# Patient Record
Sex: MALE | Race: WHITE
[De-identification: names, ages, dates, MRNs, and addresses within clinical notes are randomized per-mention and may not be internally consistent; named-entity substitution may affect disease eponyms.]

---

## 2019-07-23 ENCOUNTER — HOSPITAL ENCOUNTER (INPATIENT)
Dept: HOSPITAL 96 - M.ERS | Age: 59
LOS: 6 days | Discharge: HOME | DRG: 872 | End: 2019-07-29
Attending: INTERNAL MEDICINE | Admitting: INTERNAL MEDICINE
Payer: COMMERCIAL

## 2019-07-23 VITALS — WEIGHT: 160 LBS | HEIGHT: 65.98 IN | BODY MASS INDEX: 25.71 KG/M2

## 2019-07-23 VITALS — SYSTOLIC BLOOD PRESSURE: 117 MMHG | DIASTOLIC BLOOD PRESSURE: 66 MMHG

## 2019-07-23 VITALS — DIASTOLIC BLOOD PRESSURE: 85 MMHG | SYSTOLIC BLOOD PRESSURE: 126 MMHG

## 2019-07-23 DIAGNOSIS — Z87.442: ICD-10-CM

## 2019-07-23 DIAGNOSIS — M48.02: ICD-10-CM

## 2019-07-23 DIAGNOSIS — N45.2: ICD-10-CM

## 2019-07-23 DIAGNOSIS — N45.1: ICD-10-CM

## 2019-07-23 DIAGNOSIS — N43.3: ICD-10-CM

## 2019-07-23 DIAGNOSIS — A41.9: Primary | ICD-10-CM

## 2019-07-23 DIAGNOSIS — N50.3: ICD-10-CM

## 2019-07-23 DIAGNOSIS — Z88.5: ICD-10-CM

## 2019-07-23 DIAGNOSIS — Z79.899: ICD-10-CM

## 2019-07-23 DIAGNOSIS — Z88.8: ICD-10-CM

## 2019-07-23 DIAGNOSIS — G89.29: ICD-10-CM

## 2019-07-23 LAB
ABSOLUTE BASOPHILS: (no result) THOU/UL (ref 0–0.2)
ABSOLUTE EOSINOPHILS: (no result) THOU/UL (ref 0–0.7)
ABSOLUTE MONOCYTES: (no result) THOU/UL (ref 0–1.2)
ABSOLUTE MONOCYTES: 1.2 THOU/UL (ref 0–1.2)
ALBUMIN SERPL-MCNC: 3.2 G/DL (ref 3.4–5)
ALP SERPL-CCNC: 185 U/L (ref 46–116)
ALT SERPL-CCNC: 107 U/L (ref 30–65)
ANION GAP SERPL CALC-SCNC: 12 MMOL/L (ref 7–16)
AST SERPL-CCNC: 57 U/L (ref 15–37)
BASOPHILS NFR BLD AUTO: (no result) %
BILIRUB SERPL-MCNC: 0.9 MG/DL
BILIRUB UR-MCNC: NEGATIVE MG/DL
BUN SERPL-MCNC: 11 MG/DL (ref 7–18)
CALCIUM SERPL-MCNC: 9.5 MG/DL (ref 8.5–10.1)
CHLORIDE SERPL-SCNC: 99 MMOL/L (ref 98–107)
CO2 SERPL-SCNC: 24 MMOL/L (ref 21–32)
COLOR UR: YELLOW
CREAT SERPL-MCNC: 1 MG/DL (ref 0.6–1.3)
EOSINOPHIL NFR BLD: (no result) %
GLUCOSE SERPL-MCNC: 144 MG/DL (ref 70–99)
GRANULOCYTES NFR BLD MANUAL: (no result) %
GRANULOCYTES NFR BLD MANUAL: 95 %
HCT VFR BLD CALC: (no result) % (ref 42–52)
HCT VFR BLD CALC: 41.8 % (ref 42–52)
HGB BLD-MCNC: (no result) GM/DL (ref 14–18)
HGB BLD-MCNC: 14.1 GM/DL (ref 14–18)
KETONES UR STRIP-MCNC: (no result) MG/DL
LYMPHOCYTES # BLD: (no result) THOU/UL (ref 0.8–5.3)
LYMPHOCYTES # BLD: 0.3 THOU/UL (ref 0.8–5.3)
LYMPHOCYTES NFR BLD AUTO: (no result) %
LYMPHOCYTES NFR BLD AUTO: 1 %
MCH RBC QN AUTO: (no result) PG (ref 26–34)
MCH RBC QN AUTO: 32 PG (ref 26–34)
MCHC RBC AUTO-ENTMCNC: (no result) G/DL (ref 28–37)
MCHC RBC AUTO-ENTMCNC: 33.8 G/DL (ref 28–37)
MCV RBC: (no result) FL (ref 80–100)
MCV RBC: 94.6 FL (ref 80–100)
MONOCYTES NFR BLD: (no result) %
MONOCYTES NFR BLD: 4 %
MPV: (no result) FL. (ref 7.2–11.1)
MPV: 8.1 FL. (ref 7.2–11.1)
NEUTROPHILS # BLD: (no result) THOU/UL (ref 1.6–8.1)
NEUTROPHILS # BLD: 28.4 THOU/UL (ref 1.6–8.1)
NUCLEATED RBCS: (no result) /100WBC
NUCLEATED RBCS: 0 /100WBC
PLATELET # BLD EST: ADEQUATE 10*3/UL
PLATELET COUNT*: (no result) THOU/UL (ref 150–400)
PLATELET COUNT*: 359 THOU/UL (ref 150–400)
POTASSIUM SERPL-SCNC: 3.9 MMOL/L (ref 3.5–5.1)
PROT SERPL-MCNC: 7.8 G/DL (ref 6.4–8.2)
PROT UR QL STRIP: NEGATIVE
RBC # BLD AUTO: (no result) MIL/UL (ref 4.5–6)
RBC # BLD AUTO: 4.41 MIL/UL (ref 4.5–6)
RBC # UR STRIP: NEGATIVE /UL
RBC MORPH BLD: NORMAL
RDW-CV: (no result) % (ref 10.5–14.5)
RDW-CV: 12.8 % (ref 10.5–14.5)
SODIUM SERPL-SCNC: 135 MMOL/L (ref 136–145)
SP GR UR STRIP: <= 1.005 (ref 1–1.03)
URINE CLARITY: CLEAR
URINE GLUCOSE-RANDOM: NEGATIVE
URINE LEUKOCYTES-REFLEX: NEGATIVE
URINE NITRITE-REFLEX: NEGATIVE
UROBILINOGEN UR STRIP-ACNC: 0.2 E.U./DL (ref 0.2–1)
WBC # BLD AUTO: (no result) THOU/UL (ref 4–11)
WBC # BLD AUTO: 29.9 THOU/UL (ref 4–11)

## 2019-07-24 VITALS — DIASTOLIC BLOOD PRESSURE: 62 MMHG | SYSTOLIC BLOOD PRESSURE: 107 MMHG

## 2019-07-24 VITALS — SYSTOLIC BLOOD PRESSURE: 112 MMHG | DIASTOLIC BLOOD PRESSURE: 69 MMHG

## 2019-07-24 LAB
ABSOLUTE BASOPHILS: 0 THOU/UL (ref 0–0.2)
ABSOLUTE EOSINOPHILS: 0 THOU/UL (ref 0–0.7)
ABSOLUTE MONOCYTES: 2 THOU/UL (ref 0–1.2)
ANION GAP SERPL CALC-SCNC: 7 MMOL/L (ref 7–16)
BASOPHILS NFR BLD AUTO: 0.2 %
BUN SERPL-MCNC: 8 MG/DL (ref 7–18)
CALCIUM SERPL-MCNC: 8.4 MG/DL (ref 8.5–10.1)
CHLORIDE SERPL-SCNC: 104 MMOL/L (ref 98–107)
CO2 SERPL-SCNC: 28 MMOL/L (ref 21–32)
CREAT SERPL-MCNC: 0.8 MG/DL (ref 0.6–1.3)
EOSINOPHIL NFR BLD: 0 %
GLUCOSE SERPL-MCNC: 120 MG/DL (ref 70–99)
GRANULOCYTES NFR BLD MANUAL: 88.3 %
HCT VFR BLD CALC: 37 % (ref 42–52)
HGB BLD-MCNC: 12.6 GM/DL (ref 14–18)
LYMPHOCYTES # BLD: 0.9 THOU/UL (ref 0.8–5.3)
LYMPHOCYTES NFR BLD AUTO: 3.5 %
MCH RBC QN AUTO: 32.4 PG (ref 26–34)
MCHC RBC AUTO-ENTMCNC: 34 G/DL (ref 28–37)
MCV RBC: 95.2 FL (ref 80–100)
MONOCYTES NFR BLD: 8 %
MPV: 8.1 FL. (ref 7.2–11.1)
NEUTROPHILS # BLD: 21.9 THOU/UL (ref 1.6–8.1)
NUCLEATED RBCS: 0 /100WBC
PLATELET COUNT*: 306 THOU/UL (ref 150–400)
POTASSIUM SERPL-SCNC: 4.2 MMOL/L (ref 3.5–5.1)
RBC # BLD AUTO: 3.89 MIL/UL (ref 4.5–6)
RDW-CV: 13 % (ref 10.5–14.5)
SODIUM SERPL-SCNC: 139 MMOL/L (ref 136–145)
WBC # BLD AUTO: 24.8 THOU/UL (ref 4–11)

## 2019-07-24 NOTE — NUR
PATIENT ARRIVED ON FLOOR FROM ER ABOUT 2030. PATIENT ADMISSION HISTORY AND
ASSESSMENT WAS COMPLETED AS CHARTED. IV FLUIDS AND ANTIBIOTICS WERE GIVEN AS
ORDERED. PATIENT WAS GIVEN PAIN MEDICINE ABOUT EVERY TWO HOURS FOR PAIN. WILL
CONTINUE TO MONITOR.

## 2019-07-24 NOTE — NUR
PT.LIVES WITH HIS WIFE. SHE IS RETIRED AND WILL BE THERE TO HELP HIM IF NEEDED
AT DISCHARGE. HE IS INDEPENDENT AND ACTIVE NORMALLY. WORKS FULL TIME. NO USE
OF DME AND NO HX OF HH.

## 2019-07-24 NOTE — NUR
PATIENT ALERT AND ORIENTED X 4. VITAL SIGNS STABLE ON ROOM AIR. UP
INDEPENDENLY IN ROOM. DENIES NAUSEA. PAIN BEING MANAGED WITH PO MEDICATION. IV
PATENT AND SALINE LOCKED. HOURLY ROUNDS MAINTAINED THROUGHOUT THE SHIFT. CALL
LIGHT WITHIN REACH. NURSING WILL CONTINUE TO MONITOR.

## 2019-07-25 VITALS — SYSTOLIC BLOOD PRESSURE: 120 MMHG | DIASTOLIC BLOOD PRESSURE: 75 MMHG

## 2019-07-25 VITALS — DIASTOLIC BLOOD PRESSURE: 86 MMHG | SYSTOLIC BLOOD PRESSURE: 133 MMHG

## 2019-07-25 VITALS — DIASTOLIC BLOOD PRESSURE: 102 MMHG | SYSTOLIC BLOOD PRESSURE: 147 MMHG

## 2019-07-25 LAB
HCT VFR BLD CALC: 37.7 % (ref 42–52)
HGB BLD-MCNC: 12.6 GM/DL (ref 14–18)
MCH RBC QN AUTO: 32.1 PG (ref 26–34)
MCHC RBC AUTO-ENTMCNC: 33.4 G/DL (ref 28–37)
MCV RBC: 96 FL (ref 80–100)
MPV: 8.3 FL. (ref 7.2–11.1)
PLATELET COUNT*: 327 THOU/UL (ref 150–400)
RBC # BLD AUTO: 3.92 MIL/UL (ref 4.5–6)
RDW-CV: 13 % (ref 10.5–14.5)
WBC # BLD AUTO: 12.3 THOU/UL (ref 4–11)

## 2019-07-25 NOTE — NUR
ASSUMED CARE OF PATIENT AT APPROX 0730. ALERT AND ORIENTED X4. ASSESSMENT
COMPLETED AND AS CHARTED. PAIN MANAGED WITH ORAL AND IV MEDICATIONS. FLUIDS
AND ANTIBIOTICS INFUSED AS ORDERED. PATIENT UP AD ALICJA IN THE ROOM. NO
COMPLAINTS OF NAUSEA OR SOA. AFEBRILE THROUGHOUT SHIFT. CALL LIGHT WITHIN
REACH. HOURLY ROUNDS COMPLETED. WILL CONTINUE TO MONITOR.

## 2019-07-25 NOTE — NUR
PATIENT SLEPT PART OF THE NIGHT. IV FLUIDS AND VANC WERE GIVEN AS ORDERED.
PATIENT WAS GIVEN PAIN MEDICINE ABOUT EVERY 3-4 HOURS. PAIN MUCH BETTER
CONTROLLED TONIGHT THAN THE NIGHT BEFORE. WILL CONTINUE TO MONITOR.

## 2019-07-25 NOTE — CON
[address]  [address]                    CONSULTATION                  
_______________________________________________________________________________
 
Name:       SHAINA ALICEA            Room:           [address] [place]  
.R.#:  [id number]      Account #:      [id number]  
Admission:  07/23/19     Attend Phys:    EZRA Jones
Discharge:               Date of Birth:  [birth year]  
         Report #: [id number]
                                                                     [id number]  
_______________________________________________________________________________
THIS REPORT FOR:  //name//                      
 
CC: Jennifer Zamarripa
 
DATE OF SERVICE:  07/24/2019
 
 
INFECTIOUS DISEASE CONSULTATION
 
ATTENDING PHYSICIAN:  Bennett Zamarripa DO.
 
REASON FOR EVALUATION:  Left-sided epididymitis with orchitis.
 
HISTORY OF PRESENT ILLNESS:  Chart reviewed, patient examined.  This is a
59-year-old man with history of some nephrolithiasis, otherwise unremarkable,
who had a 1-2 week history of increasing pain and discomfort associated with
left lower quadrant, extending on to the scrotum and testicle.  He was concerned
about possible injury as a result of dancing.  It persisted and he was evaluated
as an outpatient.  I did raise the question of an infectious process.  He was
placed on antimicrobial therapy.  Additionally, associated with nausea and
anorexia, he did have chills, was initially given ciprofloxacin, was switched to
Macrobid as well.  Due to lack of improvement, he presented himself to the
Emergency Room.  Testicular ultrasound showed findings consistent with acute
left epididymitis, mild left orchitis, large right epididymal cyst as well.  He
was found to have elevated white blood cell count, protein 30,000, repeat was
24.8.  Urinalysis was unremarkable.  Lactic acid 1.1, mild elevation of liver
function tests as well.  Presumptively diagnosed with epididymitis.  He was
started empirically on antimicrobial therapy with vancomycin and ceftriaxone. 
Clinically, he has improved, he says, a moderate-to-marked degree at this point.
 He does still have ongoing pain, is utilizing ice which seems to help as well.
 
ALLERGIES:  LISTED TO CODEINE AND BOTOX ALONE.
 
CURRENT MEDICATIONS:  Include ceftriaxone, vancomycin, oxycodone, fentanyl as
needed, p.r.n. analgesics.
 
PAST MEDICAL HISTORY:  History of cervical stenosis as well as nephrolithiasis.
 
SOCIAL HISTORY:  Smokes an occasional cigar, has a glass of red wine on a daily
basis.  No illicit drug use.
 
FAMILY HISTORY:  Noncontributory.
 
REVIEW OF SYSTEMS:  Denies any significant pulmonary or gastrointestinal
complaints, otherwise unremarkable.  A 10-point review of systems except noted
 
 
 
[place] [address]                    CONSULTATION                  
_______________________________________________________________________________
 
Name:       SHAINA ALICEA            Room:           [address] [place]  
[facility]#:  [id number]      Account #:      [id number]  
Admission:  07/23/19     Attend Phys:    EZRA Jones
Discharge:               Date of Birth:  [birth year]  
         Report #: [id number]
                                                                     [id number]  
_______________________________________________________________________________
in the above history of present illness.
 
PHYSICAL EXAMINATION:
GENERAL:  He appears generally well-nourished, in mild-to-moderate distress.
VITAL SIGNS:  Temperature 99.3, pulse 106, respirations 16, blood pressure
117/66.
SKIN:  Warm, dry, no rashes.
HEENT:  Normocephalic.  Extraocular muscles are intact.
NECK:  Supple.
LUNGS:  Clear to auscultation bilaterally.
HEART:  Regular rate and rhythm without murmur.
ABDOMEN:  Soft, nontender, nondistended.
EXTREMITIES:  No cyanosis.
GENITOURINARY:  Moderate inflammatory changes noted.  Asymmetrical swelling on
the left side, it is quite tender to palpation, particularly over the left
testicle and under the inguinal site.  There are no ulcerations, no bullous
lesions.
RECTAL:  Deferred.
 
LABORATORY DATA:  Blood cultures sterile thus far.  Most recent CBC:  White
count 24.8, H and H 12.6 and 37.0, platelets 306.  Electrolytes:  Sodium 139,
potassium 4.2, chloride 104, bicarbonate 28, anion gap of 7, BUN and creatinine
8 and 0.8.  Urinalysis was unremarkable.  Lactic acid 1.1.  Liver functions: 
AST of 57, ALT of 107, total bilirubin of 0.9, total protein of 7.8, albumin
3.2.  Estimated GFR of 77.
 
ASSESSMENT:  Left-sided epididymitis, orchitis.  We will continue current
empiric therapeutic approach with vancomycin and ceftriaxone, at this point,
barring something positive on the blood culture, which I think is a fairly low
yield test, we would treat empirically.  At this point, he seems to have
responded to treatment.  Continue the combination of parenteral therapy.  At
some point we will likely transition to oral in the event of any localizing
signs or symptoms.  Repeat imaging.  Perhaps would be amenable to drainage
procedure.  Discussed with the patient in detail.
 
 
 
 
 
 
 
 
 
 
<ELECTRONICALLY SIGNED>
                                        By:  Manuel Hoffman MD           
07/25/19     1135
D: 07/24/19 1634_______________________________________
T: 07/25/19 0144Manuel Hoffman MD              /nt

## 2019-07-26 VITALS — DIASTOLIC BLOOD PRESSURE: 84 MMHG | SYSTOLIC BLOOD PRESSURE: 128 MMHG

## 2019-07-26 VITALS — SYSTOLIC BLOOD PRESSURE: 127 MMHG | DIASTOLIC BLOOD PRESSURE: 77 MMHG

## 2019-07-26 VITALS — SYSTOLIC BLOOD PRESSURE: 127 MMHG | DIASTOLIC BLOOD PRESSURE: 78 MMHG

## 2019-07-26 LAB
ABSOLUTE BASOPHILS: 0.1 THOU/UL (ref 0–0.2)
ABSOLUTE EOSINOPHILS: 0.1 THOU/UL (ref 0–0.7)
ABSOLUTE MONOCYTES: 0.8 THOU/UL (ref 0–1.2)
BASOPHILS NFR BLD AUTO: 0.9 %
EOSINOPHIL NFR BLD: 0.7 %
GRANULOCYTES NFR BLD MANUAL: 74.2 %
HCT VFR BLD CALC: 33.2 % (ref 42–52)
HGB BLD-MCNC: 11.1 GM/DL (ref 14–18)
LYMPHOCYTES # BLD: 1.3 THOU/UL (ref 0.8–5.3)
LYMPHOCYTES NFR BLD AUTO: 15.4 %
MCH RBC QN AUTO: 31.8 PG (ref 26–34)
MCHC RBC AUTO-ENTMCNC: 33.3 G/DL (ref 28–37)
MCV RBC: 95.6 FL (ref 80–100)
MONOCYTES NFR BLD: 8.8 %
MPV: 8.5 FL. (ref 7.2–11.1)
NEUTROPHILS # BLD: 6.4 THOU/UL (ref 1.6–8.1)
NUCLEATED RBCS: 0 /100WBC
PLATELET COUNT*: 305 THOU/UL (ref 150–400)
RBC # BLD AUTO: 3.47 MIL/UL (ref 4.5–6)
RDW-CV: 13.1 % (ref 10.5–14.5)
WBC # BLD AUTO: 8.6 THOU/UL (ref 4–11)

## 2019-07-26 NOTE — NUR
PT SLEPT OFF AND ON OVERNIGHT. IV AND PO PAIN MED GIVEN WITH FAIR RELIEF OF L
TESTICULAR PAIN. UP AD ALICJA VOIDING WITHOUT DIFFICULTY. AOX4, PLEASANT, STATES
HE FEELS THE  EPIDIDYMITIS IS IMPROVING SOMEWHAT, BUT STILL PAINFUL. AM LABS
DRAWN. LHAND IVF INFUSING PEP PUMP, ABX GIVEN AS ORDERED. ZOFRAN GIVEN PRN TO
ALLEVIATE NAUSEA THAT PAIN MEDS SOMETIMES CAUSE. ICE PACKS PRN FOR COMFORT TO
SCROTUM. ABLE TO USE CALL LITE AND MAKE NEEDS KNOWN.

## 2019-07-27 VITALS — DIASTOLIC BLOOD PRESSURE: 77 MMHG | SYSTOLIC BLOOD PRESSURE: 132 MMHG

## 2019-07-27 VITALS — DIASTOLIC BLOOD PRESSURE: 75 MMHG | SYSTOLIC BLOOD PRESSURE: 128 MMHG

## 2019-07-27 VITALS — SYSTOLIC BLOOD PRESSURE: 141 MMHG | DIASTOLIC BLOOD PRESSURE: 76 MMHG

## 2019-07-27 VITALS — DIASTOLIC BLOOD PRESSURE: 80 MMHG | SYSTOLIC BLOOD PRESSURE: 142 MMHG

## 2019-07-27 VITALS — SYSTOLIC BLOOD PRESSURE: 143 MMHG | DIASTOLIC BLOOD PRESSURE: 87 MMHG

## 2019-07-27 LAB
ANION GAP SERPL CALC-SCNC: 6 MMOL/L (ref 7–16)
BUN SERPL-MCNC: 6 MG/DL (ref 7–18)
CALCIUM SERPL-MCNC: 8.7 MG/DL (ref 8.5–10.1)
CHLORIDE SERPL-SCNC: 110 MMOL/L (ref 98–107)
CO2 SERPL-SCNC: 29 MMOL/L (ref 21–32)
CREAT SERPL-MCNC: 0.8 MG/DL (ref 0.6–1.3)
GLUCOSE SERPL-MCNC: 87 MG/DL (ref 70–99)
POTASSIUM SERPL-SCNC: 3.9 MMOL/L (ref 3.5–5.1)
SODIUM SERPL-SCNC: 145 MMOL/L (ref 136–145)

## 2019-07-27 NOTE — NUR
ASSUMED CARE OF PATIENT AT THIS TIME. REPORT RECEIVED FROM GEOVANNA WANG. NO
COMPLAINTS OF PAIN AT THIS TIME, NOTIFIED WHEN NEXT PAIN MEDICATION IS DUE.
ICE PACK REFILLED FOR SCROTUM. PRN MIRALAX GIVEN PER ORDERS. IVF INFUSING,
SCHED ABX TO INFUSE.

## 2019-07-27 NOTE — NUR
Alert and oriented x 4. He is up independently in the room. He's voided
adequately and without any discomfort. His scrotum has improved with the
swelling. He is using ice packs and his wife bought him a scrotal support and
that has helped. Vitals are stable. Roomair sat was 97-98% but he does have
some fine crackles in LLL, IS encouraged. He is taking pain meds every 4 hrs
and he rates his pain 8-9. He did get intermitten sleep.

## 2019-07-28 VITALS — SYSTOLIC BLOOD PRESSURE: 152 MMHG | DIASTOLIC BLOOD PRESSURE: 87 MMHG

## 2019-07-28 VITALS — SYSTOLIC BLOOD PRESSURE: 119 MMHG | DIASTOLIC BLOOD PRESSURE: 79 MMHG

## 2019-07-28 VITALS — DIASTOLIC BLOOD PRESSURE: 67 MMHG | SYSTOLIC BLOOD PRESSURE: 115 MMHG

## 2019-07-28 LAB
ABSOLUTE BASOPHILS: 0.1 THOU/UL (ref 0–0.2)
ABSOLUTE MONOCYTES: 0.6 THOU/UL (ref 0–1.2)
BASOPHILS NFR BLD AUTO: 1 %
GRANULOCYTES NFR BLD MANUAL: 72 %
HCT VFR BLD CALC: 31 % (ref 42–52)
HGB BLD-MCNC: 10.6 GM/DL (ref 14–18)
LYMPHOCYTES # BLD: 1.3 THOU/UL (ref 0.8–5.3)
LYMPHOCYTES NFR BLD AUTO: 16 %
MCH RBC QN AUTO: 32.7 PG (ref 26–34)
MCHC RBC AUTO-ENTMCNC: 34.2 G/DL (ref 28–37)
MCV RBC: 95.7 FL (ref 80–100)
METAMYELOCYTES NFR BLD: 2 %
MONOCYTES NFR BLD: 8 %
MPV: 8.4 FL. (ref 7.2–11.1)
NEUTROPHILS # BLD: 5.8 THOU/UL (ref 1.6–8.1)
NUCLEATED RBCS: 0 /100WBC
PLATELET # BLD EST: ADEQUATE 10*3/UL
PLATELET COUNT*: 324 THOU/UL (ref 150–400)
RBC # BLD AUTO: 3.24 MIL/UL (ref 4.5–6)
RDW-CV: 13.2 % (ref 10.5–14.5)
TOXIC GRANULES BLD QL SMEAR: (no result)
VARIANT LYMPHS NFR BLD MANUAL: 1 %
WBC # BLD AUTO: 7.8 THOU/UL (ref 4–11)

## 2019-07-28 NOTE — NUR
PATIENT HAS SLEPT OFF AND ON DURING THE NIGHT. VSS ON RA. MEDICATIONS GIVEN AS
ORDERED AND CHARTED. IV IN LEFT HAND-NS @ 100ML/HR. IV ABT GIVEN WITHOUT ANY
ADVERSE SIDE EFFECTS NOTED. PATIENT INSTRUCTED TO USE CALL LIGHT WHEN NEEDING
ASSISTANCE. HOURLY ROUNDS MADE. WILL CONTINUE WITH PLAN OF CARE AND NURSING TO
MONITOR.

## 2019-07-28 NOTE — NUR
PATIENT ALERT AND ORIENTED X 4. VITAL SIGNS STABLE ON ROOM AIR. UP
INDEPENDENTLY IN ROOM. IV PATENT WITH FLUIDS INFUSING. PAIN BEING MANAGED WITH
PO MEDICATION. DENIES NAUSEA. SWELLING TO SCROTUM HAS IMPROVED. ICE PACK
REFILLED AND PLACED ON SCROTUM. HOURLY ROUNDS MAINTAINED THROUGHOUT THE SHIFT.
CALL LIGHT WITHIN REACH. NURSING WILL CONTINUE TO MONITOR.

## 2019-07-29 VITALS — SYSTOLIC BLOOD PRESSURE: 161 MMHG | DIASTOLIC BLOOD PRESSURE: 81 MMHG

## 2019-07-29 VITALS — DIASTOLIC BLOOD PRESSURE: 81 MMHG | SYSTOLIC BLOOD PRESSURE: 161 MMHG

## 2019-07-29 NOTE — NUR
PATIENT HAS SLEPT WELL THROUGHOUT THE NIGHT. VSS ON RA. PAIN WELL CONTROLLED
WITH ORAL PAIN MEDICATION. PATIENT IS UP AD-ALICJA. IV IN LEFT HAND-NS @
100ML/HR. IV ABT GIVEN WITHOUT ANY ADVERSE SIDE EFFECTS NOTED. UA SENT TO LAB.
PATIENT INSTRUCTED TO USE CALL LIGHT WHEN NEEDING ASSISTANCE. HOURLY ROUNDS
MADE. WILL CONTINUE WITH PLAN OF CARE AND NURSING TO MONITOR.

## 2019-07-29 NOTE — NUR
ASSESSMENT COMPLETE. PT ALERT AND ORIENTED X4. PAIN MEDICATION GIVEN AS
NEEDED. PT HAD IV ABX THIS MORNING. PT GIVEN PRESCRIPTIONS AND DISCHARGE
INSTRUCTIONS AND VERBALIZES UNDERSTANDING. PT WILL FOLLOW UP WITH UROLOGY IN
7-10 DAYS. IV TAKEN OUT WITHOUT COMPLICATIONS. PT LEFT WITH SPOUSE IN PERSONAL
VEHICLE, ALL BELONGINGS SENT WITH PT. SEE ASSESSMENT AND VITALS FOR OTHER
DETAILS.